# Patient Record
Sex: MALE | URBAN - METROPOLITAN AREA
[De-identification: names, ages, dates, MRNs, and addresses within clinical notes are randomized per-mention and may not be internally consistent; named-entity substitution may affect disease eponyms.]

---

## 2017-10-13 RX ORDER — FLUTICASONE PROPIONATE 110 UG/1
AEROSOL, METERED RESPIRATORY (INHALATION)
Qty: 1 INHALER | Refills: 0 | Status: SHIPPED | OUTPATIENT
Start: 2017-10-13 | End: 2017-10-20 | Stop reason: SDUPTHER

## 2017-10-20 ENCOUNTER — OFFICE VISIT (OUTPATIENT)
Dept: FAMILY MEDICINE CLINIC | Facility: CLINIC | Age: 27
End: 2017-10-20

## 2017-10-20 ENCOUNTER — HOSPITAL ENCOUNTER (OUTPATIENT)
Dept: LAB | Age: 27
Discharge: HOME | End: 2017-10-20

## 2017-10-20 VITALS
WEIGHT: 185 LBS | SYSTOLIC BLOOD PRESSURE: 120 MMHG | RESPIRATION RATE: 15 BRPM | TEMPERATURE: 98 F | BODY MASS INDEX: 26.48 KG/M2 | OXYGEN SATURATION: 99 % | HEIGHT: 70 IN | HEART RATE: 76 BPM | DIASTOLIC BLOOD PRESSURE: 84 MMHG

## 2017-10-20 DIAGNOSIS — Z23 ENCOUNTER FOR IMMUNIZATION: ICD-10-CM

## 2017-10-20 DIAGNOSIS — L28.2 PAPULAR URTICARIA: ICD-10-CM

## 2017-10-20 DIAGNOSIS — Z11.3 SCREEN FOR STD (SEXUALLY TRANSMITTED DISEASE): ICD-10-CM

## 2017-10-20 DIAGNOSIS — R63.5 WEIGHT GAIN: Primary | ICD-10-CM

## 2017-10-20 DIAGNOSIS — J45.30 MILD PERSISTENT ASTHMA WITHOUT COMPLICATION: ICD-10-CM

## 2017-10-20 PROCEDURE — 99001 SPECIMEN HANDLING PT-LAB: CPT | Performed by: FAMILY MEDICINE

## 2017-10-20 RX ORDER — ALBUTEROL SULFATE 90 UG/1
2 AEROSOL, METERED RESPIRATORY (INHALATION)
Qty: 1 INHALER | Refills: 0 | Status: SHIPPED | OUTPATIENT
Start: 2017-10-20 | End: 2018-11-19 | Stop reason: SDUPTHER

## 2017-10-20 RX ORDER — FLUTICASONE PROPIONATE 110 UG/1
AEROSOL, METERED RESPIRATORY (INHALATION)
Qty: 3 INHALER | Refills: 0 | Status: SHIPPED | OUTPATIENT
Start: 2017-10-20 | End: 2018-03-14 | Stop reason: SDUPTHER

## 2017-10-20 NOTE — PROGRESS NOTES
SUBJECTIVE  Chief Complaint   Patient presents with    Asthma       HPI:     He has been lost to follow up    His asthma is well controlled. He has not been needing Albuterol if he uses Flovent regularly. His rhinitis is controlled. He has gained 60 # over last several months. He has been eating more and exercising less. He wants to have STD checked. He is sexually active. A month ago, he had few scattered pruritic papules on arms and legs, which he thought was from insect bites. The have healed. No Systemic, Cardiovascular or Respiratory symptoms. Past Medical History:   Diagnosis Date    Asthma     Depression     Trauma      Past Surgical History:   Procedure Laterality Date    HX HEENT  2007    nasal vestibule papiloma resection        Current Outpatient Prescriptions   Medication Sig    fluticasone (FLOVENT HFA) 110 mcg/actuation inhaler INHALE 2 PUFFS BY MOUTH EVERY 12 HOURS    albuterol (PROAIR HFA) 90 mcg/actuation inhaler Take 2 Puffs by inhalation every six (6) hours as needed for Wheezing.  inhalational spacing device Use with inhalors. No current facility-administered medications for this visit. Allergies: No known allergy to drugs. ROS:   Constitutional: No fever, chills, night sweats, malaise, dizziness. Ear/Nose/Throat: No ear/ throat/ sinus pain, lesions, new speaking or hearing problems, nose bleed. Cardiovascular: No angina, palpitations, PND, orthopnea, lightheadedness, edema, claudication. Respiratory: No pleurisy, hemoptysis, unusual sputum. Gastrointestinal: No nausea/ vomiting, bowel habit change, pain, CHERRY symptoms, melena, hematochezia, anorexia. Psychiatric: No agitation, confusion/disorientation, suicidal or homicidal ideation. Genitourinary: No pain, penile discharge, dysuria, urgency, frequency, nocturia, hematuria, hesitancy, incontinence.     OBJECTIVE  Constitutional: General Appearance:  well developed, well nourished, nontoxic, in no acute distress. Visit Vitals    /84 (BP 1 Location: Left arm, BP Patient Position: Sitting)    Pulse 76    Temp 98 °F (36.7 °C)    Resp 15    Ht 5' 10\" (1.778 m)    Wt 185 lb (83.9 kg)    SpO2 99%    BMI 26.54 kg/m2     ENT/Mouth: Otoscopic Examination: external auditory canals are clear; tympanic membranes are clear. Nasal Cavity: mildly swollen mucosa & turbinates. Maxillas are nontender w/o redness or heat. Throat: clear tonsils, oropharynx, posterior pharynx. Pulmonary: Respiratory effort: normal; no dyspnea, no retractions, no accessory muscle use. Auscultation: normal & symmetrical air exchange; no rales, no rhonchi, no wheeze; no rubs    Cardiovascular: Palpation: PMI not displaced or enlarged, no thrills or heaves. Auscultation: RRR; no murmur, rubs or gallops. Extremities: no edema. Gastrointestinal: Normal bowel sounds. No masses; no tenderness; no rebound/rigidity; no CVA tenderness. No hepatosplenomegaly. Nodes: Cervical: no significant adenopathy. Psychiatric[de-identified] Oriented to time, place and person. Normal mood, no agitation or anxiety. Normal affect. Pleasant and cooperative. [de-identified] Scrotal: no cord tenderness. Penis: uncircumcized male, no masses, normal urethra, no discharge. Musculoskeletal: NC/AT. Neck-supple. Skin: no active rash. Few scattered healed papules on arms and legs. ASSESSMENT    1. Weight gain    2. Mild persistent asthma without complication    3. Screen for STD (sexually transmitted disease)    4. Papular urticaria, healed    5.  Encounter for immunization      PLAN    Orders Placed This Encounter    CBC WITH AUTOMATED DIFF    METABOLIC PANEL, COMPREHENSIVE    URINALYSIS W/ RFLX MICROSCOPIC    LIPID PANEL    TSH 3RD GENERATION    RPR    HEPATITIS C AB    CHLAMYDIA/NEISSERIA BY WINDY W/REFLEX CONFIRM    HEP B SURFACE AB    HEPATITIS B CORE AB, TOTAL    HEP B SURFACE AG    HEMOGLOBIN A1C WITH EAG    METABOLIC PANEL, COMPREHENSIVE    HIV 1/2 AG/AB, 4TH GENERATION,W RFLX CONFIRM    diph,Pertuss,Acell,,Tet Vac-PF (ADACEL) 2 Lf-(2.5-5-3-5 mcg)-5Lf/0.5 mL susp    albuterol (PROAIR HFA) 90 mcg/actuation inhaler    pneumococcal 23-valent (PNEUMOVAX 23) 25 mcg/0.5 mL injection       Pharmacologic Management: Medications reviewed with the patient. Continue with current medications. Skin care. STD prevention. Discussed DDx, follow-up & work-up. Discussed risk/benefit & side effect of treatment. Low salt ADA diet, weightloss & graduated excecise. Follow up in 1 month, prn sooner. Asthma/Allergy Education reviewed. Health risk from non adherence discussed. Patient voiced understanding. Follow-up Disposition:  Return in about 1 month (around 11/20/2017).     Jeanie Jc MD

## 2017-10-20 NOTE — PROGRESS NOTES
Jona Andujar is a 24 y.o.  male presents today for office visit for routine fasting follow up. Pt is in Room # 5.      1. Have you been to the ER, urgent care clinic since your last visit? Hospitalized since your last visit? No    2. Have you seen or consulted any other health care providers outside of the [address] [place] [address] since your last visit? Include any pap smears or colon screening. No    Health Maintenance reviewed. Pt obtained influenza through his local pharmacy. Upcoming Appts  N/A    Requested Prescriptions     Pending Prescriptions Disp Refills    diph,Pertuss,Acell,,Tet Vac-PF (ADACEL) 2 Lf-(2.5-5-3-5 mcg)-5Lf/0.5 mL susp 1 Syringe 0     Si.5 mL by IntraMUSCular route once for 1 dose.  albuterol (PROAIR HFA) 90 mcg/actuation inhaler 1 Inhaler 0     Sig: Take 2 Puffs by inhalation every six (6) hours as needed for Wheezing.

## 2017-10-24 LAB
ALBUMIN SERPL-MCNC: 4.3 G/DL (ref 3.5–5.5)
ALBUMIN/GLOB SERPL: 1.5 {RATIO} (ref 1.2–2.2)
ALP SERPL-CCNC: 77 IU/L (ref 39–117)
ALT SERPL-CCNC: 18 IU/L (ref 0–44)
APPEARANCE UR: CLEAR
AST SERPL-CCNC: 18 IU/L (ref 0–40)
BASOPHILS # BLD AUTO: 0 X10E3/UL (ref 0–0.2)
BASOPHILS NFR BLD AUTO: 0 %
BILIRUB SERPL-MCNC: 0.2 MG/DL (ref 0–1.2)
BILIRUB UR QL STRIP: NEGATIVE
BUN SERPL-MCNC: 11 MG/DL (ref 6–20)
BUN/CREAT SERPL: 14 (ref 9–20)
C TRACH RRNA SPEC QL NAA+PROBE: NEGATIVE
CALCIUM SERPL-MCNC: 9.3 MG/DL (ref 8.7–10.2)
CHLORIDE SERPL-SCNC: 97 MMOL/L (ref 96–106)
CHOLEST SERPL-MCNC: 185 MG/DL (ref 100–199)
CO2 SERPL-SCNC: 26 MMOL/L (ref 18–29)
COLOR UR: YELLOW
CREAT SERPL-MCNC: 0.8 MG/DL (ref 0.76–1.27)
EOSINOPHIL # BLD AUTO: 0.5 X10E3/UL (ref 0–0.4)
EOSINOPHIL NFR BLD AUTO: 6 %
ERYTHROCYTE [DISTWIDTH] IN BLOOD BY AUTOMATED COUNT: 14.4 % (ref 12.3–15.4)
EST. AVERAGE GLUCOSE BLD GHB EST-MCNC: 105 MG/DL
GFR SERPLBLD CREATININE-BSD FMLA CKD-EPI: 124 ML/MIN/1.73
GFR SERPLBLD CREATININE-BSD FMLA CKD-EPI: 144 ML/MIN/1.73
GLOBULIN SER CALC-MCNC: 2.8 G/DL (ref 1.5–4.5)
GLUCOSE SERPL-MCNC: 98 MG/DL (ref 65–99)
GLUCOSE UR QL: NEGATIVE
HBA1C MFR BLD: 5.3 % (ref 4.8–5.6)
HBV CORE AB SERPL QL IA: NEGATIVE
HBV SURFACE AB SER QL: NON REACTIVE
HBV SURFACE AG SERPL QL IA: NEGATIVE
HCT VFR BLD AUTO: 41.5 % (ref 37.5–51)
HCV AB S/CO SERPL IA: <0.1 S/CO RATIO (ref 0–0.9)
HDLC SERPL-MCNC: 54 MG/DL
HGB BLD-MCNC: 13.6 G/DL (ref 12.6–17.7)
HGB UR QL STRIP: NEGATIVE
IMM GRANULOCYTES # BLD: 0 X10E3/UL (ref 0–0.1)
IMM GRANULOCYTES NFR BLD: 0 %
INTERPRETATION, 910389: NORMAL
KETONES UR QL STRIP: NEGATIVE
LDLC SERPL CALC-MCNC: 115 MG/DL (ref 0–99)
LEUKOCYTE ESTERASE UR QL STRIP: NEGATIVE
LYMPHOCYTES # BLD AUTO: 2.3 X10E3/UL (ref 0.7–3.1)
LYMPHOCYTES NFR BLD AUTO: 32 %
MCH RBC QN AUTO: 28.1 PG (ref 26.6–33)
MCHC RBC AUTO-ENTMCNC: 32.8 G/DL (ref 31.5–35.7)
MCV RBC AUTO: 86 FL (ref 79–97)
MICRO URNS: ABNORMAL
MONOCYTES # BLD AUTO: 0.5 X10E3/UL (ref 0.1–0.9)
MONOCYTES NFR BLD AUTO: 7 %
N GONORRHOEA RRNA SPEC QL NAA+PROBE: NEGATIVE
NEUTROPHILS # BLD AUTO: 4 X10E3/UL (ref 1.4–7)
NEUTROPHILS NFR BLD AUTO: 55 %
NITRITE UR QL STRIP: NEGATIVE
PH UR STRIP: 8 [PH] (ref 5–7.5)
PLATELET # BLD AUTO: 289 X10E3/UL (ref 150–379)
POTASSIUM SERPL-SCNC: 4.2 MMOL/L (ref 3.5–5.2)
PROT SERPL-MCNC: 7.1 G/DL (ref 6–8.5)
PROT UR QL STRIP: NEGATIVE
RBC # BLD AUTO: 4.84 X10E6/UL (ref 4.14–5.8)
RPR SER QL: NON REACTIVE
SODIUM SERPL-SCNC: 138 MMOL/L (ref 134–144)
SP GR UR: 1.02 (ref 1–1.03)
TRIGL SERPL-MCNC: 82 MG/DL (ref 0–149)
TSH SERPL DL<=0.005 MIU/L-ACNC: 1.08 UIU/ML (ref 0.45–4.5)
UROBILINOGEN UR STRIP-MCNC: 0.2 MG/DL (ref 0.2–1)
VLDLC SERPL CALC-MCNC: 16 MG/DL (ref 5–40)
WBC # BLD AUTO: 7.3 X10E3/UL (ref 3.4–10.8)

## 2017-10-25 ENCOUNTER — TELEPHONE (OUTPATIENT)
Dept: FAMILY MEDICINE CLINIC | Facility: CLINIC | Age: 27
End: 2017-10-25

## 2017-11-10 ENCOUNTER — TELEPHONE (OUTPATIENT)
Dept: FAMILY MEDICINE CLINIC | Facility: CLINIC | Age: 27
End: 2017-11-10

## 2017-11-10 ENCOUNTER — OFFICE VISIT (OUTPATIENT)
Dept: FAMILY MEDICINE CLINIC | Facility: CLINIC | Age: 27
End: 2017-11-10

## 2017-11-10 VITALS
OXYGEN SATURATION: 98 % | WEIGHT: 189 LBS | SYSTOLIC BLOOD PRESSURE: 122 MMHG | BODY MASS INDEX: 27.06 KG/M2 | TEMPERATURE: 97.4 F | DIASTOLIC BLOOD PRESSURE: 80 MMHG | HEART RATE: 62 BPM | RESPIRATION RATE: 14 BRPM | HEIGHT: 70 IN

## 2017-11-10 DIAGNOSIS — J45.30 MILD PERSISTENT ASTHMA WITHOUT COMPLICATION: ICD-10-CM

## 2017-11-10 DIAGNOSIS — R63.5 WEIGHT GAIN: Primary | ICD-10-CM

## 2017-11-10 PROBLEM — J45.909 UNCOMPLICATED ASTHMA: Status: ACTIVE | Noted: 2017-11-10

## 2017-11-10 PROBLEM — J45.909 UNCOMPLICATED ASTHMA: Status: RESOLVED | Noted: 2017-11-10 | Resolved: 2017-11-10

## 2017-11-10 NOTE — PROGRESS NOTES
SUBJECTIVE  Chief Complaint   Patient presents with    Follow-up     weight gain, asthma       HPI:     His asthma is well controlled. He has not been needing Albuterol. His rhinitis is controlled. He has gained 60 # over last several months. He has been eating better since 2 weeks ago. No Systemic, Cardiovascular or Respiratory symptoms. Past Medical History:   Diagnosis Date    Asthma     Depression     Trauma      Past Surgical History:   Procedure Laterality Date    HX HEENT  2007    nasal vestibule papiloma resection        Current Outpatient Prescriptions   Medication Sig    fluticasone (FLOVENT HFA) 110 mcg/actuation inhaler INHALE 2 PUFFS BY MOUTH EVERY 12 HOURS    albuterol (PROAIR HFA) 90 mcg/actuation inhaler Take 2 Puffs by inhalation every six (6) hours as needed for Wheezing.  inhalational spacing device Use with inhalors. No current facility-administered medications for this visit. Allergies: No known allergy to drugs. ROS:   Constitutional: No fever, chills, night sweats, malaise, dizziness. Ear/Nose/Throat: No ear/ throat/ sinus pain, lesions, new speaking or hearing problems, nose bleed. Cardiovascular: No angina, palpitations, PND, orthopnea, lightheadedness, edema, claudication. Respiratory: No pleurisy, hemoptysis, unusual sputum. Gastrointestinal: No nausea/ vomiting, bowel habit change, pain, CHERRY symptoms, melena, hematochezia, anorexia. Psychiatric: No agitation, confusion/disorientation, suicidal or homicidal ideation. OBJECTIVE  Constitutional: General Appearance:  well developed, well nourished, nontoxic, in no acute distress.      Visit Vitals    /80 (BP 1 Location: Left arm, BP Patient Position: Sitting)    Pulse 62    Temp 97.4 °F (36.3 °C) (Oral)    Resp 14    Ht 5' 10\" (1.778 m)    Wt 189 lb (85.7 kg)    SpO2 98%    BMI 27.12 kg/m2     Pulmonary: Respiratory effort: normal; no dyspnea, no retractions, no accessory muscle use.   Auscultation: normal & symmetrical air exchange; no rales, no rhonchi, no wheeze; no rubs    Cardiovascular: Palpation: PMI not displaced or enlarged, no thrills or heaves. Auscultation: RRR; no murmur, rubs or gallops. Extremities: no edema. Gastrointestinal: Normal bowel sounds. No masses; no tenderness; no rebound/rigidity; no CVA tenderness. No hepatosplenomegaly. Nodes: Cervical: no significant adenopathy. Psychiatric[de-identified] Oriented to time, place and person. Musculoskeletal: NC/AT. Neck-supple. Skin: no active rash. ASSESSMENT    1. Weight gain    2. Uncomplicated asthma, unspecified asthma severity, unspecified whether persistent        PLAN    Pharmacologic Management: Medications reviewed with the patient. Continue with current medications. STD prevention. Discussed DDx, follow-up & work-up. Discussed risk/benefit & side effect of treatment. Low salt ADA diet, weightloss & graduated excecise. Follow up in 3 month, prn sooner. Asthma/Allergy Education reviewed. Health risk from non adherence discussed. Patient voiced understanding. Follow-up Disposition:  Return in about 3 months (around 2/10/2018).     Cornelia Prado MD

## 2017-11-10 NOTE — PROGRESS NOTES
Nehal Dick is a 24 y.o.  male presents today for office visit for follow up. Pt is in Room # 5.      1. Have you been to the ER, urgent care clinic since your last visit? Hospitalized since your last visit? No    2. Have you seen or consulted any other health care providers outside of the [address] [place] [address] since your last visit? Include any pap smears or colon screening. No    Health Maintenance reviewed.       Upcoming Appts  N/A

## 2017-11-10 NOTE — TELEPHONE ENCOUNTER
Received a call from the Pharmacist at  [address], [address]  [address] [place] [id number]  Phone: [phone] Fax: [phone] if an alternative can be dispensed instead of Adacel and the diagnosis r/t pneumococcal prescription. Staff is advised ok to dispense an alternative Tdap that is available and the diagnosis for pneumococcal vaccine is Asthma per Dr. Hitesh Sanchez. Staff verbally acknowledges understanding and voices no further concerns at this time.

## 2017-11-11 LAB — HIV 1+2 AB+HIV1 P24 AG SERPL QL IA: NON REACTIVE

## 2017-11-15 ENCOUNTER — TELEPHONE (OUTPATIENT)
Dept: FAMILY MEDICINE CLINIC | Facility: CLINIC | Age: 27
End: 2017-11-15

## 2017-11-15 NOTE — TELEPHONE ENCOUNTER
Spoke with pt in regards to lab results. Two pt identifier's and permission to release verified. Relayed 's notes. Pt acknowledges understanding and states he will decline coming in the office re:results. Pt states [place] is going [place] the [place] and will follow up when he is available. Pt voices no concerns at this time.

## 2018-03-14 RX ORDER — FLUTICASONE PROPIONATE 110 UG/1
AEROSOL, METERED RESPIRATORY (INHALATION)
Qty: 3 INHALER | Refills: 0 | Status: SHIPPED | OUTPATIENT
Start: 2018-03-14 | End: 2018-11-19 | Stop reason: SDUPTHER

## 2018-03-14 NOTE — TELEPHONE ENCOUNTER
Pt calling to request medication refill of:       Requested Prescriptions     Pending Prescriptions Disp Refills    fluticasone (FLOVENT HFA) 110 mcg/actuation inhaler 3 Inhaler 0     Sig: INHALE 2 PUFFS BY MOUTH EVERY 12 HOURS          be sent to [facility] on [address]   Pt has about 0 remaining. Pts last appt was 11/10/17, next appt sched forTuesday, April 03, 2018 10:30 AM.   Yesenia Walters pt of 72 hour time frame for refill requests. Please advise.

## 2018-09-11 ENCOUNTER — TELEPHONE (OUTPATIENT)
Dept: FAMILY MEDICINE CLINIC | Age: 28
End: 2018-09-11

## 2018-09-11 NOTE — TELEPHONE ENCOUNTER
Pt called stating that he has sores all over his body for about 5 weeks now. Also he reports having chest pain for 4 days now. Explained that he needs to be advised to go to the ED or urgent care for the chest pain especially that we have a weather alert.  Pt stated that he will get this addressed today but still want to be sched

## 2018-11-19 ENCOUNTER — OFFICE VISIT (OUTPATIENT)
Dept: FAMILY MEDICINE CLINIC | Age: 28
End: 2018-11-19

## 2018-11-19 VITALS
DIASTOLIC BLOOD PRESSURE: 64 MMHG | RESPIRATION RATE: 16 BRPM | OXYGEN SATURATION: 99 % | BODY MASS INDEX: 19.1 KG/M2 | HEART RATE: 78 BPM | TEMPERATURE: 98 F | SYSTOLIC BLOOD PRESSURE: 120 MMHG | WEIGHT: 133.4 LBS | HEIGHT: 70 IN

## 2018-11-19 DIAGNOSIS — S61.412A LACERATION OF LEFT HAND WITHOUT FOREIGN BODY, INITIAL ENCOUNTER: ICD-10-CM

## 2018-11-19 DIAGNOSIS — R63.4 WEIGHT LOSS: Primary | ICD-10-CM

## 2018-11-19 DIAGNOSIS — J45.30 MILD PERSISTENT ASTHMA WITHOUT COMPLICATION: ICD-10-CM

## 2018-11-19 DIAGNOSIS — Z13.220 LIPID SCREENING: ICD-10-CM

## 2018-11-19 DIAGNOSIS — F33.9 RECURRENT DEPRESSION (HCC): ICD-10-CM

## 2018-11-19 DIAGNOSIS — Z23 ENCOUNTER FOR IMMUNIZATION: ICD-10-CM

## 2018-11-19 RX ORDER — FLUTICASONE PROPIONATE 110 UG/1
AEROSOL, METERED RESPIRATORY (INHALATION)
Qty: 3 INHALER | Refills: 0 | Status: SHIPPED | OUTPATIENT
Start: 2018-11-19 | End: 2018-11-21 | Stop reason: SDUPTHER

## 2018-11-19 RX ORDER — ALBUTEROL SULFATE 90 UG/1
2 AEROSOL, METERED RESPIRATORY (INHALATION)
Qty: 1 INHALER | Refills: 0 | Status: SHIPPED | OUTPATIENT
Start: 2018-11-19 | End: 2018-11-21 | Stop reason: SDUPTHER

## 2018-11-19 NOTE — PROGRESS NOTES
Robles Bravo is a 34 y.o. male who is here to f/u on his \"behavorial problem\", Depression and recent hospital visit for lacerations on his left hand. VS as documented.

## 2018-11-19 NOTE — PROGRESS NOTES
SUBJECTIVE  Chief Complaint   Patient presents with   [id number] [facility] Follow Up     11/18/19, left hand lacerations    Depression    Weight Loss       HPI:    He has been lost to follow-up. His asthma is well controlled. He has been rarely needing Albuterol. His rhinitis is also controlled. He has lost 56 # over last several months. His parents informed me that he was depressed and was not seeing his psychiatrist for the last several months. He was not eating well due to emotional issues. Now he is seeing a therapist and his appetite has improved. He also has a appointment with his psychiatrist for follow-up. He sustained a small laceration at the base of his left thumb yesterday from a broken glass. He went to the emergency room and it was sutured. He has some discoloration on the posterior thumb. Otherwise, he denies any complaint from the laceration. No Systemic, Cardiovascular or Respiratory symptoms. Past Medical History:   Diagnosis Date    Asthma     Depression     Trauma      Past Surgical History:   Procedure Laterality Date    HX HEENT  2007    nasal vestibule papiloma resection        Current Outpatient Medications   Medication Sig    fluticasone (FLOVENT HFA) 110 mcg/actuation inhaler INHALE 2 PUFFS BY MOUTH EVERY 12 HOURS    albuterol (PROAIR HFA) 90 mcg/actuation inhaler Take 2 Puffs by inhalation every six (6) hours as needed for Wheezing.  inhalational spacing device Use with inhalors. No current facility-administered medications for this visit. Allergies: No known allergy to drugs. ROS:   Constitutional: No fever, chills, night sweats, malaise, dizziness. Eyes: No eye discomfort, discharge, redness, new visual changes. Ear/Nose/Throat: No ear/ throat/ sinus pain, lesions, new speaking or hearing problems, nose bleed. Cardiovascular: No angina, palpitations, PND, orthopnea, lightheadedness, edema, claudication.    Respiratory: No pleurisy, hemoptysis, unusual sputum. Gastrointestinal: No nausea/ vomiting, bowel habit change, pain, CHERRY symptoms, melena, hematochezia, anorexia. Psychiatric: No agitation, confusion/disorientation, suicidal or homicidal ideation. Neurological: No seizures, numbness, dizziness, speech abnormality, incontinence. Genitourinary: No dysuria, urgency, frequency, nocturia, hematuria, hesitancy, incontinence. Endocrine: denies any other complaints. Heme/Onc: denies any complaints. Skin: no other complaints. Musculoskeletal: no complaints. OBJECTIVE  Constitutional: General Appearance:  well developed, well nourished, nontoxic, in no acute distress. Visit Vitals  /64 (BP 1 Location: Left arm, BP Patient Position: Sitting)   Pulse 78   Temp 98 °F (36.7 °C) (Oral)   Resp 16   Ht 5' 10\" (1.778 m)   Wt 133 lb 6.4 oz (60.5 kg)   SpO2 99%   BMI 19.14 kg/m²     Eyes[de-identified] Conjunctiva: normal & Lids: normal.  Pupils & Irises: normal size; equal, round and reactive to light. ENT- Otoscopic Examination: external auditory canals are clear; tympanic membranes are dull. Nasal Cavity: mildly swollen mucosa & turbinates. Maxillas are not tender w/o redness or heat. Throat: clear tonsils, oropharynx, posterior pharynx. Neck Area[de-identified] Neck: without masses, symmetric, trachea is midline. Thyroid:  without significant enlargement, masses or tenderness. Nodes[de-identified] Cervical: no significant adenopathy. Inguinal: no significant adenopathy. Pulmonary: Respiratory effort: normal; no dyspnea, no retractions, no accessory muscle use. Auscultation: normal & symmetrical air exchange; no rales, no rhonchi, no wheeze; no rubs    Cardiovascular: Palpation: PMI not displaced or enlarged, no thrills or heaves. Auscultation: RRR; no murmur, rubs or gallops. Extremities: no edema. Gastrointestinal: Normal bowel sounds. No masses; no tenderness; no rebound/rigidity; no CVA tenderness. No hepatosplenomegaly.     Psychiatric[de-identified] Oriented to time, place and person. Musculoskeletal:   NC/AT. Neck-supple. Gait and station: Normal  Extremities: No acute synovitis. No focal weakness. Lt Hand: thumb- good ROM. No swelling. Skin: There are few scattered papules on the back. No induration, heat, redness, tenderness. Laceration at the base of the left thumb is well sutured. It is clean and dry. There is a small area of ecchymosis in the posterior DIP area. Good capillary refill. No swelling. No exudate. ASSESSMENT    1. Weight loss    2. Mild persistent asthma without complication    3. Recurrent depression ([facility] [address]    4. Laceration of left hand without foreign body, initial encounter    5. Lipid screening    6. Encounter for immunization        PLAN    Orders Placed This Encounter    Influenza virus vaccine (QUADRIVALENT PRES FREE SYRINGE) IM ([id number])    CBC WITH AUTOMATED DIFF    METABOLIC PANEL, COMPREHENSIVE    TSH 3RD GENERATION    T4, FREE    URINALYSIS W/ RFLX MICROSCOPIC    LIPID PANEL    REFERRAL TO NUTRITION    fluticasone (FLOVENT HFA) 110 mcg/actuation inhaler    albuterol (PROAIR HFA) 90 mcg/actuation inhaler   He will get the lab fasting tomorrow and call us for results. Pharmacologic Management: Medications reviewed with the patient. Continue with current medications. Wound care discussed in details and follow-up as needed for the wound also discussed in details. Patient has already been scheduled for suture removal in the emergency room where it was sutured. The patient is advised to follow-up with therapist and psychiatrist.    He is being referred to a nutritionist and he is advised to follow-up sooner if the weight loss is not reversed. Discussed DDx, follow-up & work-up. Discussed risk/benefit & side effect of treatment. Follow up in 3 month, prn sooner. Asthma/Allergy Education reviewed. Health risk from non adherence discussed. Patient voiced understanding.     Follow-up Disposition:  Return in about 3 months (around 2/19/2019), or if symptoms worsen or fail to improve.     Patricia Toscano MD

## 2018-11-20 NOTE — TELEPHONE ENCOUNTER
Pt called to speak to Dr Cande Mazariegos regarding his medication that was filled on 11/19/18. Pt stated that he might have an issue with [facility]. Pt will call back 11/20/18 to speak with Dr HARLEY or his nurse.

## 2018-11-21 DIAGNOSIS — S61.012A LACERATION OF LEFT THUMB WITHOUT FOREIGN BODY WITHOUT DAMAGE TO NAIL, INITIAL ENCOUNTER: Primary | ICD-10-CM

## 2018-11-21 LAB
ALBUMIN SERPL-MCNC: 4.7 G/DL (ref 3.5–5.5)
ALBUMIN/GLOB SERPL: 1.7 {RATIO} (ref 1.2–2.2)
ALP SERPL-CCNC: 60 IU/L (ref 39–117)
ALT SERPL-CCNC: 17 IU/L (ref 0–44)
APPEARANCE UR: CLEAR
AST SERPL-CCNC: 15 IU/L (ref 0–40)
BASOPHILS # BLD AUTO: 0 X10E3/UL (ref 0–0.2)
BASOPHILS NFR BLD AUTO: 0 %
BILIRUB SERPL-MCNC: 0.8 MG/DL (ref 0–1.2)
BILIRUB UR QL STRIP: NEGATIVE
BUN SERPL-MCNC: 13 MG/DL (ref 6–20)
BUN/CREAT SERPL: 16 (ref 9–20)
CALCIUM SERPL-MCNC: 9.5 MG/DL (ref 8.7–10.2)
CHLORIDE SERPL-SCNC: 103 MMOL/L (ref 96–106)
CHOLEST SERPL-MCNC: 181 MG/DL (ref 100–199)
CO2 SERPL-SCNC: 24 MMOL/L (ref 20–29)
COLOR UR: YELLOW
CREAT SERPL-MCNC: 0.81 MG/DL (ref 0.76–1.27)
EOSINOPHIL # BLD AUTO: 0.1 X10E3/UL (ref 0–0.4)
EOSINOPHIL NFR BLD AUTO: 1 %
ERYTHROCYTE [DISTWIDTH] IN BLOOD BY AUTOMATED COUNT: 15.1 % (ref 12.3–15.4)
GLOBULIN SER CALC-MCNC: 2.8 G/DL (ref 1.5–4.5)
GLUCOSE SERPL-MCNC: 89 MG/DL (ref 65–99)
GLUCOSE UR QL: NEGATIVE
HCT VFR BLD AUTO: 41.1 % (ref 37.5–51)
HDLC SERPL-MCNC: 73 MG/DL
HGB BLD-MCNC: 14 G/DL (ref 13–17.7)
HGB UR QL STRIP: NEGATIVE
IMM GRANULOCYTES # BLD: 0 X10E3/UL (ref 0–0.1)
IMM GRANULOCYTES NFR BLD: 0 %
INTERPRETATION, 910389: NORMAL
KETONES UR QL STRIP: NEGATIVE
LDLC SERPL CALC-MCNC: 96 MG/DL (ref 0–99)
LEUKOCYTE ESTERASE UR QL STRIP: NEGATIVE
LYMPHOCYTES # BLD AUTO: 1.5 X10E3/UL (ref 0.7–3.1)
LYMPHOCYTES NFR BLD AUTO: 21 %
MCH RBC QN AUTO: 29.5 PG (ref 26.6–33)
MCHC RBC AUTO-ENTMCNC: 34.1 G/DL (ref 31.5–35.7)
MCV RBC AUTO: 87 FL (ref 79–97)
MICRO URNS: NORMAL
MONOCYTES # BLD AUTO: 0.5 X10E3/UL (ref 0.1–0.9)
MONOCYTES NFR BLD AUTO: 7 %
NEUTROPHILS # BLD AUTO: 5.1 X10E3/UL (ref 1.4–7)
NEUTROPHILS NFR BLD AUTO: 71 %
NITRITE UR QL STRIP: NEGATIVE
PH UR STRIP: 7.5 [PH] (ref 5–7.5)
PLATELET # BLD AUTO: 266 X10E3/UL (ref 150–379)
POTASSIUM SERPL-SCNC: 4 MMOL/L (ref 3.5–5.2)
PROT SERPL-MCNC: 7.5 G/DL (ref 6–8.5)
PROT UR QL STRIP: NEGATIVE
RBC # BLD AUTO: 4.74 X10E6/UL (ref 4.14–5.8)
SODIUM SERPL-SCNC: 141 MMOL/L (ref 134–144)
SP GR UR: 1.02 (ref 1–1.03)
T4 FREE SERPL-MCNC: 1.55 NG/DL (ref 0.82–1.77)
TRIGL SERPL-MCNC: 58 MG/DL (ref 0–149)
TSH SERPL DL<=0.005 MIU/L-ACNC: 1.71 UIU/ML (ref 0.45–4.5)
UROBILINOGEN UR STRIP-MCNC: 0.2 MG/DL (ref 0.2–1)
VLDLC SERPL CALC-MCNC: 12 MG/DL (ref 5–40)
WBC # BLD AUTO: 7.3 X10E3/UL (ref 3.4–10.8)

## 2018-11-21 RX ORDER — FLUTICASONE PROPIONATE 110 UG/1
AEROSOL, METERED RESPIRATORY (INHALATION)
Qty: 1 INHALER | Refills: 0 | Status: SHIPPED | OUTPATIENT
Start: 2018-11-21 | End: 2018-12-03 | Stop reason: SDUPTHER

## 2018-11-21 RX ORDER — ALBUTEROL SULFATE 90 UG/1
2 AEROSOL, METERED RESPIRATORY (INHALATION)
Qty: 1 INHALER | Refills: 0 | Status: SHIPPED | OUTPATIENT
Start: 2018-11-21

## 2018-11-21 NOTE — PROGRESS NOTES
Referral given to plastics at patient's request.  His sensation distal to Lt thumb is decreased from laceration.

## 2018-11-21 NOTE — TELEPHONE ENCOUNTER
Patient requesting his inhalers be sent to the [facility] on [facility] as he can not wait for them to make it from [facility].

## 2018-12-03 ENCOUNTER — OFFICE VISIT (OUTPATIENT)
Dept: FAMILY MEDICINE CLINIC | Age: 28
End: 2018-12-03

## 2018-12-03 VITALS
HEIGHT: 70 IN | TEMPERATURE: 97.2 F | OXYGEN SATURATION: 99 % | WEIGHT: 130.6 LBS | RESPIRATION RATE: 17 BRPM | DIASTOLIC BLOOD PRESSURE: 66 MMHG | HEART RATE: 75 BPM | SYSTOLIC BLOOD PRESSURE: 128 MMHG | BODY MASS INDEX: 18.7 KG/M2

## 2018-12-03 DIAGNOSIS — F32.A DEPRESSION, UNSPECIFIED DEPRESSION TYPE: ICD-10-CM

## 2018-12-03 DIAGNOSIS — R63.4 WEIGHT LOSS: ICD-10-CM

## 2018-12-03 DIAGNOSIS — J45.30 MILD PERSISTENT ASTHMA WITHOUT COMPLICATION: Primary | ICD-10-CM

## 2018-12-03 NOTE — PROGRESS NOTES
Judith Cifuentes is a 34 y.o. male presents in office for f/u. Health Maintenance Due   Topic Date Due    Pneumococcal 19-64 Medium Risk (1 of 1 - PPSV23) 09/27/2011    DTaP/Tdap/Td series (1 - Tdap) 09/27/2013       1. Have you been to the ER, urgent care clinic since your last visit? Hospitalized since your last visit? Yes. 11/28/2018 - Candi Mendez - Stitches to left hand. 2. Have you seen or consulted any other health care providers outside of the [address] [place] [address] since your last visit? Include any pap smears or colon screening.  No

## 2019-09-06 ENCOUNTER — OFFICE VISIT (OUTPATIENT)
Dept: FAMILY MEDICINE CLINIC | Age: 29
End: 2019-09-06

## 2019-09-06 VITALS
OXYGEN SATURATION: 98 % | TEMPERATURE: 98 F | HEIGHT: 70 IN | SYSTOLIC BLOOD PRESSURE: 130 MMHG | BODY MASS INDEX: 24.28 KG/M2 | WEIGHT: 169.6 LBS | DIASTOLIC BLOOD PRESSURE: 76 MMHG | HEART RATE: 84 BPM | RESPIRATION RATE: 16 BRPM

## 2019-09-06 DIAGNOSIS — J45.30 MILD PERSISTENT ASTHMA WITHOUT COMPLICATION: Primary | ICD-10-CM

## 2019-09-06 DIAGNOSIS — L70.0 ACNE VULGARIS: ICD-10-CM

## 2019-09-06 DIAGNOSIS — F33.9 RECURRENT DEPRESSION (HCC): ICD-10-CM

## 2019-09-06 DIAGNOSIS — R63.4 WEIGHT LOSS: ICD-10-CM

## 2019-09-06 RX ORDER — FLUTICASONE PROPIONATE 110 UG/1
2 AEROSOL, METERED RESPIRATORY (INHALATION) EVERY 12 HOURS
Qty: 1 INHALER | Refills: 0 | Status: SHIPPED | OUTPATIENT
Start: 2019-09-06 | End: 2019-09-06 | Stop reason: SDUPTHER

## 2019-09-06 RX ORDER — FLUTICASONE PROPIONATE 110 UG/1
2 AEROSOL, METERED RESPIRATORY (INHALATION) EVERY 12 HOURS
COMMUNITY
End: 2019-09-06 | Stop reason: SDUPTHER

## 2019-09-06 RX ORDER — FLUTICASONE PROPIONATE 110 UG/1
2 AEROSOL, METERED RESPIRATORY (INHALATION) EVERY 12 HOURS
Qty: 1 INHALER | Refills: 0 | Status: SHIPPED | OUTPATIENT
Start: 2019-09-06

## 2019-09-06 NOTE — PROGRESS NOTES
Rupesh Rodriguez is a 34 y.o. male presents in office for   Chief Complaint   Patient presents with    Asthma     f/u         Health Maintenance Due   Topic Date Due    Pneumococcal 0-64 years (1 of 1 - PPSV23) 09/27/1998    DTaP/Tdap/Td series (1 - Tdap) 09/27/2013    Influenza Age 9 to Adult  08/01/2019       1. Have you been to the ER, urgent care clinic since your last visit? Hospitalized since your last visit? No    2. Have you seen or consulted any other health care providers outside of the [address] [place] [address] since your last visit? Include any pap smears or colon screening. No      Patient in interested in the flu vaccine. However, I advised he would need to return to clinic as we do not have the vaccine in office today.

## 2019-09-06 NOTE — PROGRESS NOTES
SUBJECTIVE  Chief Complaint   Patient presents with    Asthma     f/u       HPI:    His asthma is controlled with Flovent HFA; but he does not like HFA. He wants Diskus  which was ordered 6 months ago; but his pharmacy was still giving him HFA from prior prescription. He wants now the prescription for Diskus to be sent to mail order and HFA for 1 month to be sent to local pharmacy until the mail order arrives. He has been rarely using Albuterol. His rhinitis is also controlled. At last visit several months ago he had lost about 56 pounds over several months prior to that. He said it was due to emotional issues causing poor appetite. He has been following up therapist and psychiatrist since and his appetite has picked up, he is feeling much better and he is gaining the weight back. He had acne is on his back for over a year. He has been scrubbing the skin for the last few months and now the acne is are healing well. No Systemic, Cardiovascular or Respiratory symptoms. Past Medical History:   Diagnosis Date    Asthma     Depression     Trauma      Past Surgical History:   Procedure Laterality Date    HX HEENT  2007    nasal vestibule papiloma resection        Current Outpatient Medications   Medication Sig    fluticasone propionate (FLOVENT HFA) 110 mcg/actuation inhaler Take 2 Puffs by inhalation every twelve (12) hours.  albuterol (PROAIR HFA) 90 mcg/actuation inhaler Take 2 Puffs by inhalation every six (6) hours as needed for Wheezing.  inhalational spacing device Use with inhalors. No current facility-administered medications for this visit. Allergies: No known allergy to drugs. ROS:   Constitutional: No fever, chills, night sweats, malaise, dizziness. Ear/Nose/Throat: No ear/ throat/ sinus pain, lesions, new speaking or hearing problems, nose bleed. Cardiovascular: No angina, palpitations, PND, orthopnea, lightheadedness, edema, claudication.    Respiratory: No pleurisy, hemoptysis, unusual sputum. Gastrointestinal: No nausea/ vomiting, bowel habit change, pain, CHERRY symptoms, melena, hematochezia, anorexia. Psychiatric: No agitation, confusion/disorientation, suicidal or homicidal ideation. Neurological: No seizures, numbness, dizziness, speech abnormality, incontinence. Skin: no other complaints. Musculoskeletal: no complaints. OBJECTIVE  Constitutional: General Appearance:  well developed, well nourished, nontoxic, in no acute distress. Visit Vitals  /76 (BP 1 Location: Left arm, BP Patient Position: Standing)   Pulse 84   Temp 98 °F (36.7 °C) (Oral)   Resp 16   Ht 5' 10\" (1.778 m)   Wt 169 lb 9.6 oz (76.9 kg)   SpO2 98%   BMI 24.34 kg/m²     Otoscopic Examination: external auditory canals are clear; tympanic membranes are dull. Nasal Cavity: mildly swollen mucosa & turbinates. Maxillas are not tender w/o redness or heat. Throat: clear tonsils, oropharynx, posterior pharynx. Pulmonary: Respiratory effort: normal; no dyspnea, no retractions, no accessory muscle use. Auscultation: normal & symmetrical air exchange; no rales, no rhonchi, no wheeze; no rubs    Cardiovascular: Palpation: PMI not displaced or enlarged, no thrills or heaves. Auscultation: RRR; no murmur, rubs or gallops. Extremities: no edema. Gastrointestinal: Normal bowel sounds. No masses; no tenderness; no rebound/rigidity; no CVA tenderness. No hepatosplenomegaly. Psychiatric[de-identified] Oriented to time, place and person. Musculoskeletal:   NC/AT. Neck-supple. Extremities: No focal weakness. Skin: Hyperpigmented spots on back bilaterally from healed papular acneiform lesions. There are no other skin abnormalities. No induration, heat, redness or tenderness.       Lab Results   Component Value Date/Time    WBC 7.3 11/20/2018 09:21 AM    HGB 14.0 11/20/2018 09:21 AM    HCT 41.1 11/20/2018 09:21 AM    PLATELET 123 26/62/8283 09:21 AM    MCV 87 11/20/2018 09:21 AM Lab Results   Component Value Date/Time    Sodium 141 11/20/2018 09:21 AM    Potassium 4.0 11/20/2018 09:21 AM    Chloride 103 11/20/2018 09:21 AM    CO2 24 11/20/2018 09:21 AM    Glucose 89 11/20/2018 09:21 AM    BUN 13 11/20/2018 09:21 AM    Creatinine 0.81 11/20/2018 09:21 AM    BUN/Creatinine ratio 16 11/20/2018 09:21 AM    GFR est  11/20/2018 09:21 AM    GFR est non- 11/20/2018 09:21 AM    Calcium 9.5 11/20/2018 09:21 AM    Bilirubin, total 0.8 11/20/2018 09:21 AM    AST (SGOT) 15 11/20/2018 09:21 AM    Alk. phosphatase 60 11/20/2018 09:21 AM    Protein, total 7.5 11/20/2018 09:21 AM    Albumin 4.7 11/20/2018 09:21 AM    A-G Ratio 1.7 11/20/2018 09:21 AM    ALT (SGPT) 17 11/20/2018 09:21 AM     Lab Results   Component Value Date/Time    TSH 1.710 11/20/2018 09:21 AM    T4, Free 1.55 11/20/2018 09:21 AM     Lab Results   Component Value Date/Time    Hemoglobin A1c 5.3 10/20/2017 11:38 AM     Lab Results   Component Value Date/Time    Cholesterol, total 181 11/20/2018 09:21 AM    HDL Cholesterol 73 11/20/2018 09:21 AM    LDL, calculated 96 11/20/2018 09:21 AM    VLDL, calculated 12 11/20/2018 09:21 AM    Triglyceride 58 11/20/2018 09:21 AM         ASSESSMENT    1. Mild persistent asthma without complication    2. Weight loss    3. Recurrent depression ([address]    4. Acne vulgaris    Asthma is controlled with current controller. Weight loss has been reversed with treatment of his psychiatric issues  Recurrent depression is improving with treatment from psychiatrist and therapist.  Acne vulgaris on back is significantly improved with skin care. PLAN    Orders Placed This Encounter    PULMONARY FUNCTION TEST    fluticasone propionate (FLOVENT DISKUS) 250 mcg/actuation dsdv    fluticasone propionate (FLOVENT HFA) 110 mcg/actuation inhaler     Pharmacologic Management: Medications reviewed with the patient. Change to Flovent Diskus 250 q 12h.     As maintenance: Patient will get influenza immunization with his pharmacy    The patient is advised to follow-up with therapist and psychiatrist.  He will continue with the skin care for his acne since it is working well without side effects. All questions answered. Discussed DDx, follow-up & work-up. Discussed risk/benefit & side effect of treatment. Follow up as scheduled, prn sooner. Asthma/Allergy Education reviewed. Low salt ADA diet & graduated excecise. Health risk from non adherence discussed. Patient voiced understanding. Follow-up and Dispositions    · Return in about 3 months (around 12/6/2019), or fasting.        Que Diaz MD

## 2019-09-30 ENCOUNTER — TELEPHONE (OUTPATIENT)
Dept: FAMILY MEDICINE CLINIC | Age: 29
End: 2019-09-30

## 2019-09-30 NOTE — TELEPHONE ENCOUNTER
Patient contacted with results per PCP, verified 2 patient identifiers. He has no further questions or concerns at this time.

## 2019-12-26 ENCOUNTER — OFFICE VISIT (OUTPATIENT)
Dept: FAMILY MEDICINE CLINIC | Age: 29
End: 2019-12-26

## 2019-12-26 VITALS
TEMPERATURE: 95.8 F | HEART RATE: 87 BPM | WEIGHT: 158 LBS | RESPIRATION RATE: 16 BRPM | OXYGEN SATURATION: 99 % | HEIGHT: 70 IN | SYSTOLIC BLOOD PRESSURE: 132 MMHG | DIASTOLIC BLOOD PRESSURE: 84 MMHG | BODY MASS INDEX: 22.62 KG/M2

## 2019-12-26 DIAGNOSIS — M25.511 CHRONIC RIGHT SHOULDER PAIN: ICD-10-CM

## 2019-12-26 DIAGNOSIS — F33.9 RECURRENT DEPRESSION (HCC): ICD-10-CM

## 2019-12-26 DIAGNOSIS — J45.30 MILD PERSISTENT ASTHMA WITHOUT COMPLICATION: Primary | ICD-10-CM

## 2019-12-26 DIAGNOSIS — Z13.220 LIPID SCREENING: ICD-10-CM

## 2019-12-26 DIAGNOSIS — G89.29 CHRONIC RIGHT SHOULDER PAIN: ICD-10-CM

## 2019-12-26 DIAGNOSIS — Z23 ENCOUNTER FOR IMMUNIZATION: ICD-10-CM

## 2019-12-26 DIAGNOSIS — Z87.898 HISTORY OF WEIGHT LOSS: ICD-10-CM

## 2019-12-26 RX ORDER — DEXTROAMPHETAMINE SACCHARATE, AMPHETAMINE ASPARTATE, DEXTROAMPHETAMINE SULFATE AND AMPHETAMINE SULFATE 5; 5; 5; 5 MG/1; MG/1; MG/1; MG/1
20 TABLET ORAL
COMMUNITY

## 2019-12-26 RX ORDER — CITALOPRAM 20 MG/1
TABLET, FILM COATED ORAL DAILY
COMMUNITY

## 2019-12-26 NOTE — PROGRESS NOTES
Colt Goldberg is a 34 y.o. male (: 1990) presenting to address:    Chief Complaint   Patient presents with   [address]     patient stated he is here today for a three month folllow up and fasting labs       Vitals:    19 1051   BP: (!) 137/93   Pulse: 87   Resp: 16   Temp: 95.8 °F (35.4 °C)   TempSrc: Oral   SpO2: 99%   Weight: 158 lb (71.7 kg)   Height: 5' 10\" (1.778 m)   PainSc:   3   PainLoc: Shoulder       Hearing/Vision:   No exam data present    Learning Assessment:     Learning Assessment 2016   PRIMARY LEARNER Patient   HIGHEST LEVEL OF EDUCATION - PRIMARY LEARNER  2 YEARS OF COLLEGE   BARRIERS PRIMARY LEARNER NONE   CO-LEARNER CAREGIVER No   PRIMARY LANGUAGE ENGLISH   LEARNER PREFERENCE PRIMARY DEMONSTRATION   ANSWERED BY patient   RELATIONSHIP SELF     Depression Screening:     3 most recent PHQ Screens 2019   Little interest or pleasure in doing things Not at all   Feeling down, depressed, irritable, or hopeless Not at all   Total Score PHQ 2 0     Fall Risk Assessment:     Fall Risk Assessment, last 12 mths 2019   Able to walk? Yes   Fall in past 12 months? No     Abuse Screening:     Abuse Screening Questionnaire 2019   Do you ever feel afraid of your partner? N   Are you in a relationship with someone who physically or mentally threatens you? N   Is it safe for you to go home? Y     Coordination of Care Questionaire:   1. Have you been to the ER, urgent care clinic since your last visit? Hospitalized since your last visit? NO    2. Have you seen or consulted any other health care providers outside of the [address] [place] [address] since your last visit? Include any pap smears or colon screening. YES dentist    Advanced Directive:   1. Do you have an Advanced Directive? NO    2. Would you like information on Advanced Directives? NO    Flu Immunization/s administered 2019 by Dion Smart with consent. Patient tolerated procedure well.   No reactions noted.

## 2019-12-26 NOTE — PROGRESS NOTES
SUBJECTIVE  Chief Complaint   Patient presents with    Follow-up       HPI:    His asthma is controlled with Flovent HFA. He has been rarely using Albuterol. His rhinitis is also controlled. He had a rash on abdominal wall 2-3 weeks ago and was seen by dermatology after seeing . Now the rash is better. He has had right shoulder pain in 2016 due to wrong technique for work out. He again has flareup of the shoulder pain 2 months ago since started to work out. He says his range of motion exercises is not helping. No direct trauma to the shoulder. He had lost about 56 pounds over several months until about a year ago. He said it was due to emotional issues causing poor appetite. He has been following up therapist and psychiatrist since and his appetite has picked up, he is feeling much better. No Systemic, Cardiovascular or Respiratory symptoms. Past Medical History:   Diagnosis Date    Asthma     Depression     Trauma      Past Surgical History:   Procedure Laterality Date    HX HEENT  2007    nasal vestibule papiloma resection        Current Outpatient Medications   Medication Sig    citalopram (CELEXA) 20 mg tablet Take  by mouth daily.  dextroamphetamine-amphetamine (ADDERALL) 20 mg tablet Take 20 mg by mouth.  fluticasone propionate (FLOVENT DISKUS) 250 mcg/actuation dsdv Take 1 Inhalation by inhalation every twelve (12) hours.  fluticasone propionate (FLOVENT HFA) 110 mcg/actuation inhaler Take 2 Puffs by inhalation every twelve (12) hours.  albuterol (PROAIR HFA) 90 mcg/actuation inhaler Take 2 Puffs by inhalation every six (6) hours as needed for Wheezing.  inhalational spacing device Use with inhalors. No current facility-administered medications for this visit. Allergies: No known allergy to drugs. ROS:   Constitutional: No fever, chills, night sweats, malaise, dizziness.    Ear/Nose/Throat: No ear/ throat/ sinus pain, lesions, new speaking or hearing problems, nose bleed. Cardiovascular: No angina, palpitations, PND, orthopnea, lightheadedness, edema, claudication. Respiratory: No pleurisy, hemoptysis, unusual sputum. Gastrointestinal: No nausea/ vomiting, bowel habit change, pain, CHERRY symptoms, melena, hematochezia, anorexia. Psychiatric: No agitation, confusion/disorientation, suicidal or homicidal ideation. Neurological: No seizures, numbness, dizziness, speech abnormality, incontinence. Skin: no other complaints. Musculoskeletal: no complaints. OBJECTIVE  Constitutional: General Appearance:  well developed, well nourished, nontoxic, in no acute distress. Visit Vitals  /84 (BP 1 Location: Right arm, BP Patient Position: Sitting)   Pulse 87   Temp 95.8 °F (35.4 °C) (Oral)   Resp 16   Ht 5' 10\" (1.778 m)   Wt 158 lb (71.7 kg)   SpO2 99%   BMI 22.67 kg/m²     Otoscopic Examination: external auditory canals are clear; tympanic membranes are dull. Nasal Cavity: mildly swollen mucosa & turbinates. Maxillas are not tender w/o redness or heat. Throat: clear tonsils, oropharynx, posterior pharynx. Pulmonary: Respiratory effort: normal; no dyspnea, no retractions, no accessory muscle use. Auscultation: normal & symmetrical air exchange; no rales, no rhonchi, no wheeze; no rubs    Cardiovascular: Palpation: PMI not displaced or enlarged, no thrills or heaves. Auscultation: RRR; no murmur, rubs or gallops. Extremities: no edema. Gastrointestinal: Normal bowel sounds. No masses; no tenderness; no rebound/rigidity; no CVA tenderness. No hepatosplenomegaly. Psychiatric[de-identified] Oriented to time, place and person. Musculoskeletal:   NC/AT. Neck-supple. Right shoulder: No TTP. Range of motion slightly decreased with mild pain. No instability. No focal weakness.     Lab Results   Component Value Date/Time    WBC 7.3 11/20/2018 09:21 AM    HGB 14.0 11/20/2018 09:21 AM    HCT 41.1 11/20/2018 09:21 AM    PLATELET 867 98/31/6366 09:21 AM    MCV 87 11/20/2018 09:21 AM     Lab Results   Component Value Date/Time    Sodium 141 11/20/2018 09:21 AM    Potassium 4.0 11/20/2018 09:21 AM    Chloride 103 11/20/2018 09:21 AM    CO2 24 11/20/2018 09:21 AM    Glucose 89 11/20/2018 09:21 AM    BUN 13 11/20/2018 09:21 AM    Creatinine 0.81 11/20/2018 09:21 AM    BUN/Creatinine ratio 16 11/20/2018 09:21 AM    GFR est  11/20/2018 09:21 AM    GFR est non- 11/20/2018 09:21 AM    Calcium 9.5 11/20/2018 09:21 AM    Bilirubin, total 0.8 11/20/2018 09:21 AM    AST (SGOT) 15 11/20/2018 09:21 AM    Alk. phosphatase 60 11/20/2018 09:21 AM    Protein, total 7.5 11/20/2018 09:21 AM    Albumin 4.7 11/20/2018 09:21 AM    A-G Ratio 1.7 11/20/2018 09:21 AM    ALT (SGPT) 17 11/20/2018 09:21 AM     Lab Results   Component Value Date/Time    TSH 1.710 11/20/2018 09:21 AM    T4, Free 1.55 11/20/2018 09:21 AM     Lab Results   Component Value Date/Time    Hemoglobin A1c 5.3 10/20/2017 11:38 AM     Lab Results   Component Value Date/Time    Cholesterol, total 181 11/20/2018 09:21 AM    HDL Cholesterol 73 11/20/2018 09:21 AM    LDL, calculated 96 11/20/2018 09:21 AM    VLDL, calculated 12 11/20/2018 09:21 AM    Triglyceride 58 11/20/2018 09:21 AM         ASSESSMENT    1. Mild persistent asthma without complication    2. Recurrent depression ([address]    3. Chronic right shoulder pain    4. Encounter for immunization    5. Lipid screening    6. History of weight loss    Asthma is controlled.   Weight loss has been reversed with treatment of his psychiatric issues  Recurrent depression is improving with treatment from psychiatrist and therapist.    PLAN    Orders Placed This Encounter    INFLUENZA VIRUS VACCINE QUADRIVALENT, PRESERVATIVE FREE SYRINGE (02716)    CBC WITH AUTOMATED DIFF    METABOLIC PANEL, COMPREHENSIVE    LIPID PANEL    TSH 3RD GENERATION    URINALYSIS W/ RFLX MICROSCOPIC    REFERRAL TO ORTHOPEDICS    citalopram (CELEXA) 20 mg tablet    dextroamphetamine-amphetamine (ADDERALL) 20 mg tablet     Pharmacologic Management: Medications reviewed with the patient. No change from here. Health maintenance: Patient is given his flu vaccine here. He is sure that he had his pneumococcal vaccine and Tdap except he does not know where. The patient is advised to follow-up with therapist and psychiatrist.  He will follow-up with dermatology for his rash. All questions answered. Discussed DDx, follow-up & work-up. Discussed risk/benefit & side effect of treatment. Follow up as scheduled, prn sooner. Asthma/Allergy Education reviewed. Low salt ADA diet & graduated excecise. Health risk from non adherence discussed. Patient voiced understanding. Follow-up and Dispositions    · Return in about 3 months (around 3/26/2020).          Anca Bo MD

## 2019-12-27 LAB
ALBUMIN SERPL-MCNC: 4.9 G/DL (ref 3.5–5.5)
ALBUMIN/GLOB SERPL: 2 {RATIO} (ref 1.2–2.2)
ALP SERPL-CCNC: 64 IU/L (ref 39–117)
ALT SERPL-CCNC: 15 IU/L (ref 0–44)
AST SERPL-CCNC: 18 IU/L (ref 0–40)
BASOPHILS # BLD AUTO: 0.1 X10E3/UL (ref 0–0.2)
BASOPHILS NFR BLD AUTO: 1 %
BILIRUB SERPL-MCNC: 0.7 MG/DL (ref 0–1.2)
BUN SERPL-MCNC: 12 MG/DL (ref 6–20)
BUN/CREAT SERPL: 12 (ref 9–20)
CALCIUM SERPL-MCNC: 10.1 MG/DL (ref 8.7–10.2)
CHLORIDE SERPL-SCNC: 100 MMOL/L (ref 96–106)
CHOLEST SERPL-MCNC: 222 MG/DL (ref 100–199)
CO2 SERPL-SCNC: 24 MMOL/L (ref 20–29)
CREAT SERPL-MCNC: 0.97 MG/DL (ref 0.76–1.27)
EOSINOPHIL # BLD AUTO: 0.9 X10E3/UL (ref 0–0.4)
EOSINOPHIL NFR BLD AUTO: 15 %
ERYTHROCYTE [DISTWIDTH] IN BLOOD BY AUTOMATED COUNT: 12.6 % (ref 12.3–15.4)
GLOBULIN SER CALC-MCNC: 2.4 G/DL (ref 1.5–4.5)
GLUCOSE SERPL-MCNC: 91 MG/DL (ref 65–99)
HCT VFR BLD AUTO: 45.8 % (ref 37.5–51)
HDLC SERPL-MCNC: 63 MG/DL
HGB BLD-MCNC: 15.4 G/DL (ref 13–17.7)
IMM GRANULOCYTES # BLD AUTO: 0 X10E3/UL (ref 0–0.1)
IMM GRANULOCYTES NFR BLD AUTO: 0 %
INTERPRETATION, 910389: NORMAL
LDLC SERPL CALC-MCNC: 147 MG/DL (ref 0–99)
LYMPHOCYTES # BLD AUTO: 2 X10E3/UL (ref 0.7–3.1)
LYMPHOCYTES NFR BLD AUTO: 32 %
MCH RBC QN AUTO: 28.6 PG (ref 26.6–33)
MCHC RBC AUTO-ENTMCNC: 33.6 G/DL (ref 31.5–35.7)
MCV RBC AUTO: 85 FL (ref 79–97)
MONOCYTES # BLD AUTO: 0.4 X10E3/UL (ref 0.1–0.9)
MONOCYTES NFR BLD AUTO: 7 %
NEUTROPHILS # BLD AUTO: 2.9 X10E3/UL (ref 1.4–7)
NEUTROPHILS NFR BLD AUTO: 45 %
PLATELET # BLD AUTO: 280 X10E3/UL (ref 150–450)
POTASSIUM SERPL-SCNC: 3.8 MMOL/L (ref 3.5–5.2)
PROT SERPL-MCNC: 7.3 G/DL (ref 6–8.5)
RBC # BLD AUTO: 5.38 X10E6/UL (ref 4.14–5.8)
SODIUM SERPL-SCNC: 139 MMOL/L (ref 134–144)
TRIGL SERPL-MCNC: 60 MG/DL (ref 0–149)
TSH SERPL DL<=0.005 MIU/L-ACNC: 1.31 UIU/ML (ref 0.45–4.5)
VLDLC SERPL CALC-MCNC: 12 MG/DL (ref 5–40)
WBC # BLD AUTO: 6.3 X10E3/UL (ref 3.4–10.8)

## 2019-12-28 LAB
APPEARANCE UR: ABNORMAL
BILIRUB UR QL STRIP: NEGATIVE
COLOR UR: ABNORMAL
GLUCOSE UR QL: NEGATIVE
HGB UR QL STRIP: NEGATIVE
KETONES UR QL STRIP: ABNORMAL
LEUKOCYTE ESTERASE UR QL STRIP: NEGATIVE
MICRO URNS: ABNORMAL
NITRITE UR QL STRIP: NEGATIVE
PH UR STRIP: 6 [PH] (ref 5–7.5)
PROT UR QL STRIP: NEGATIVE
SP GR UR: 1.02 (ref 1–1.03)
SPECIMEN STATUS REPORT, ROLRST: NORMAL
UROBILINOGEN UR STRIP-MCNC: 0.2 MG/DL (ref 0.2–1)

## 2019-12-29 NOTE — PROGRESS NOTES
LDL cholesterol is higher. The patient is advised to follow diet and exercise as tolerated. CBC is normal except for elevated eosinophil count, may be related to allergy. Will recheck at next OV. Urine showed nonspecific abnormality. Drink more fluids. Will recheck at next OV. Metabolic panel is good.   Thyroid hormone is normal.

## 2019-12-30 ENCOUNTER — TELEPHONE (OUTPATIENT)
Dept: FAMILY MEDICINE CLINIC | Age: 29
End: 2019-12-30

## 2019-12-30 NOTE — TELEPHONE ENCOUNTER
----- Message from Serene Esparza MD sent at 12/28/2019  8:57 PM EST -----  LDL cholesterol is higher. The patient is advised to follow diet and exercise as tolerated. CBC is normal except for elevated eosinophil count, may be related to allergy. Will recheck at next OV. Urine showed nonspecific abnormality. Drink more fluids. Will recheck at next OV. Metabolic panel is good.   Thyroid hormone is normal.

## 2022-03-18 PROBLEM — J45.30 MILD PERSISTENT ASTHMA WITHOUT COMPLICATION: Status: ACTIVE | Noted: 2017-11-10

## 2022-03-19 PROBLEM — R63.4 WEIGHT LOSS: Status: ACTIVE | Noted: 2018-12-03

## 2022-03-19 PROBLEM — R63.5 WEIGHT GAIN: Status: ACTIVE | Noted: 2017-11-10

## 2023-05-17 RX ORDER — DEXTROAMPHETAMINE SACCHARATE, AMPHETAMINE ASPARTATE, DEXTROAMPHETAMINE SULFATE AND AMPHETAMINE SULFATE 5; 5; 5; 5 MG/1; MG/1; MG/1; MG/1
20 TABLET ORAL
COMMUNITY

## 2023-05-17 RX ORDER — ALBUTEROL SULFATE 90 UG/1
2 AEROSOL, METERED RESPIRATORY (INHALATION) EVERY 6 HOURS PRN
COMMUNITY
Start: 2018-11-21

## 2023-05-17 RX ORDER — FLUTICASONE PROPIONATE 250 UG/1
POWDER, METERED RESPIRATORY (INHALATION) EVERY 12 HOURS
COMMUNITY
Start: 2019-09-06

## 2023-05-17 RX ORDER — FLUTICASONE PROPIONATE 110 UG/1
2 AEROSOL, METERED RESPIRATORY (INHALATION) EVERY 12 HOURS
COMMUNITY
Start: 2019-09-06

## 2023-05-17 RX ORDER — CITALOPRAM 20 MG/1
TABLET ORAL DAILY
COMMUNITY